# Patient Record
Sex: MALE | Race: BLACK OR AFRICAN AMERICAN | NOT HISPANIC OR LATINO | Employment: FULL TIME | URBAN - METROPOLITAN AREA
[De-identification: names, ages, dates, MRNs, and addresses within clinical notes are randomized per-mention and may not be internally consistent; named-entity substitution may affect disease eponyms.]

---

## 2017-08-29 ENCOUNTER — OFFICE VISIT (OUTPATIENT)
Dept: INTERNAL MEDICINE | Facility: CLINIC | Age: 27
End: 2017-08-29
Payer: COMMERCIAL

## 2017-08-29 ENCOUNTER — CLINICAL SUPPORT (OUTPATIENT)
Dept: CARDIOLOGY | Facility: CLINIC | Age: 27
End: 2017-08-29
Payer: COMMERCIAL

## 2017-08-29 ENCOUNTER — HOSPITAL ENCOUNTER (OUTPATIENT)
Dept: RADIOLOGY | Facility: HOSPITAL | Age: 27
Discharge: HOME | End: 2017-08-29
Attending: PHYSICIAN ASSISTANT
Payer: COMMERCIAL

## 2017-08-29 VITALS
WEIGHT: 306.88 LBS | OXYGEN SATURATION: 96 % | BODY MASS INDEX: 41.57 KG/M2 | HEART RATE: 83 BPM | TEMPERATURE: 97 F | DIASTOLIC BLOOD PRESSURE: 84 MMHG | SYSTOLIC BLOOD PRESSURE: 128 MMHG | HEIGHT: 72 IN

## 2017-08-29 DIAGNOSIS — R07.9 CHEST PAIN, UNSPECIFIED TYPE: Primary | ICD-10-CM

## 2017-08-29 DIAGNOSIS — R07.9 CHEST PAIN, UNSPECIFIED TYPE: ICD-10-CM

## 2017-08-29 DIAGNOSIS — R07.89 CHEST WALL PAIN: ICD-10-CM

## 2017-08-29 PROCEDURE — 3008F BODY MASS INDEX DOCD: CPT | Mod: S$GLB,,, | Performed by: PHYSICIAN ASSISTANT

## 2017-08-29 PROCEDURE — 71020 XR CHEST PA AND LATERAL: CPT | Mod: TC,PO

## 2017-08-29 PROCEDURE — 99213 OFFICE O/P EST LOW 20 MIN: CPT | Mod: S$GLB,,, | Performed by: PHYSICIAN ASSISTANT

## 2017-08-29 PROCEDURE — 99999 PR PBB SHADOW E&M-EST. PATIENT-LVL III: CPT | Mod: PBBFAC,,, | Performed by: PHYSICIAN ASSISTANT

## 2017-08-29 PROCEDURE — 71020 XR CHEST PA AND LATERAL: CPT | Mod: 26,,, | Performed by: RADIOLOGY

## 2017-08-29 PROCEDURE — 93000 ELECTROCARDIOGRAM COMPLETE: CPT | Mod: S$GLB,,, | Performed by: INTERNAL MEDICINE

## 2017-08-29 RX ORDER — NAPROXEN 500 MG/1
500 TABLET ORAL 2 TIMES DAILY WITH MEALS
Qty: 20 TABLET | Refills: 0 | Status: SHIPPED | OUTPATIENT
Start: 2017-08-29 | End: 2017-09-08

## 2017-08-29 NOTE — PATIENT INSTRUCTIONS
Noncardiac Chest Pain    Based on your visit today, the health care provider doesnt know what is causing your chest pain. In most cases, people who come to the emergency department with chest pain dont have a problem with their heart. Instead, the pain is caused by other conditions. These may be problems with the lungs, muscles, bones, digestive tract, nerves, or mental health.  Lung problems  · Inflammation around the lungs (pleurisy)  · Collapsed lung (pneumothorax)  · Fluid around the lungs (pleural effusion)  · Lung cancer. This is a rare cause of chest pain.  Muscle or bone problems  · Inflamed cartilage between the ribs (pleurisy)  · Fibromyalgia  · Rheumatoid arthritis  Digestive system problems  · Reflux  · Stomach ulcer  · Spasms of the esophagus  · Gall stones  · Gallbladder inflammation  Mental health conditions  · Panic or anxiety attacks  · Emotional distress  Your condition doesnt seem serious and your pain doesnt appear to be coming from your heart. But sometimes the signs of a serious problem take more time to appear. Watch for the warning signs listed below.  Home care  Follow these guidelines when caring for yourself at home:  · Rest today and avoid strenuous activity.  · Take any prescribed medicine as directed.  Follow-up care  Follow up with your health care provider, or as advised, if you dont start to feel better within 24 hours.  When to seek medical advice  Call your health care provider right away if any of these occur:  · A change in the type of pain. Call if it feels different, becomes more serious, lasts longer, or begins to spread into your shoulder, arm, neck, jaw, or back.  · Shortness of breath  · You feel more pain when you breathe  · Cough with dark-colored mucus or blood  · Weakness, dizziness, or fainting  · Fever of 100.4ºF (38ºC) or higher, or as directed by your health care provider  · Swelling, pain, or redness in one leg  Date Last Reviewed: 11/24/2014  © 8473-6424  [employer]. [address] [place] [address], [place] [address]. All rights reserved. This information is not intended as a substitute for professional medical care. Always follow your healthcare professional's instructions.        Chest Wall Pain: Costochondritis    The chest pain that you have had today is caused by costochondritis. This condition is caused by an inflammation of the cartilage joining your ribs to your breastbone. It is not caused by heart or lung problems. The inflammation may have been brought on by a blow to the chest, lifting heavy objects, intense exercise, or an illness that made you cough and sneeze. It often occurs during times of emotional stress. It can be painful, but it is not dangerous. It usually goes away in 1 to 2 weeks. But it may happen again. Rarely, a more serious condition may cause symptoms similar to costochondritis. Thats why its important to watch for the warning signs listed below.  Home care  Follow these guidelines when caring for yourself at home:  · If you feel that emotional stress is a cause of your condition, try to figure out the sources of that stress. It may not be obvious! Learn ways to deal with the stress in your life. This can include regular exercise, muscle relaxation, meditation, or simply taking time out for yourself. For more information about this, talk with your health care provider. Or go to your local library and look at books on stress reduction.  · You may use acetaminophen or ibuprofen to control pain, unless another pain medicine was prescribed. If you have liver disease or ever had a stomach ulcer, talk with your health care provider before using these medicines.  · You can also help ease pain by using a hot, wet compress or heating pad. Use this with or without a medicated skin cream that helps relieves pain.  · Do stretching exercise as advised by your provider.  · Take any prescribed medicines as directed.  Follow-up care  Follow up  with your health care provider, or as advised, if you do not start to get better in the next 2 days.  When to seek medical advice  Call your health care provider right away if any of these occur:  · A change in the type of pain. Call if it feels different, becomes more serious, lasts longer, or spreads into your shoulder, arm, neck, jaw, or back.  · Shortness of breath or pain gets worse when you breathe  · Weakness, dizziness, or fainting  · Cough with dark-colored sputum (phlegm) or blood  · Abdominal pain  · Dark red or black stools  · Fever of 100.4ºF (38ºC) or higher, or as directed by your health care provider  Date Last Reviewed: 11/24/2014 © 2000-2016 [employer]. [address] [place] [address]. All rights reserved. This information is not intended as a substitute for professional medical care. Always follow your healthcare professional's instructions.        Uncertain Causes of Chest Pain    Chest pain can happen for a number of reasons. Sometimes the cause can't be determined. If your condition does not seem serious, and your pain does not appear to be coming from your heart, your healthcare provider may recommend watching it closely. Sometimes the signs of a serious problem take more time to appear. Many problems not related to your heart can cause chest pain.These include:  · Musculoskeletal. Costochondritis, an inflammation of the tissues around the ribs that can occur from trauma or overuse injuries  · Respiratory. Pneumonia, pneumothorax, or pneumonitis (inflammation of the lining of the chest and lungs)  · Gastrointestinal. Esophageal reflux, heartburn, or gallbladder disease  · Anxiety and panic disorders  · Nerve compression and neuritis  · Miscellaneous problems such as aortic aneurysm or pulmonary embolism (a blood clot in the lungs)  Home care  After your visit, follow these recommendations:  · Rest today and avoid strenuous activity.  · Take any prescribed medicine as  directed.  · Be aware of any recurrent chest pain and notice any changes  Follow-up care  Follow up with your healthcare provider if you do not start to feel better within 24 hours, or as advised.  Call 911  Call 911 if any of these occur:  · A change in the type of pain: if it feels different, becomes more severe, lasts longer, or begins to spread into your shoulder, arm, neck, jaw or back  · Shortness of breath or increased pain with breathing  · Weakness, dizziness, or fainting  · Rapid heart beat  · Crushing sensation in your chest  When to seek medical advice  Call your healthcare provider right away if any of the following occur:  · Cough with dark colored sputum (phlegm) or blood  · Fever of 100.4ºF (38ºC) or higher, or as directed by your healthcare provider  · Swelling, pain or redness in one leg  · Shortness of breath  Date Last Reviewed: 12/30/2015  © 0756-7485 [employer]. [address] [place] [address], [place] [address]. All rights reserved. This information is not intended as a substitute for professional medical care. Always follow your healthcare professional's instructions.

## 2017-08-29 NOTE — PROGRESS NOTES
Subjective:      Patient ID: Rodrigo Edwards is a 26 y.o. male.    Chief Complaint: Chest Pain    Has had this problem in the past, went to ER. Pt states that his symptoms today are different and worse.       Chest Pain    This is a new problem. The current episode started today. The onset quality is sudden. The problem occurs constantly. The problem has been unchanged. The pain is present in the lateral region. The pain is at a severity of 7/10. The quality of the pain is described as sharp and pressure (sharp pain when he inhales). The pain radiates to the mid back. Associated symptoms include back pain, exertional chest pressure and nausea. Pertinent negatives include no abdominal pain, claudication, cough, diaphoresis, dizziness, fever, headaches, hemoptysis, irregular heartbeat, leg pain, lower extremity edema, malaise/fatigue, near-syncope, numbness, orthopnea, palpitations, PND, shortness of breath, sputum production, syncope, vomiting or weakness. Treatments tried: aspirin. The treatment provided no relief. Risk factors include lack of exercise, male gender, obesity and stress.   Pertinent negatives for past medical history include no aneurysm, no anxiety/panic attacks, no aortic aneurysm, no aortic dissection, no arrhythmia, no bicuspid aortic valve, no CAD, no cancer, no congenital heart disease, no connective tissue disease, no COPD, no CHF, no diabetes, no DVT, no hyperhomocysteinemia, no hyperlipidemia, no hypertension, no Kawasaki disease, no Marfan's syndrome, no MI, no mitral valve prolapse, no pacemaker, no PE, no PVD, no recent injury, no rheumatic fever, no seizures, no sickle cell disease, no sleep apnea, no spontaneous pneumothorax, no stimulant use, no strokes, no thyroid problem, no TIA, Holloway syndrome and no valve disorder.   His family medical history is significant for CAD, diabetes, heart disease, hyperlipidemia and hypertension.   Pertinent negatives for family medical history  include: no aortic dissection, no connective tissue disease, no Marfan's syndrome, no early MI, no PE, no PVD, no sickle cell disease, no stroke, no sudden death and no TIA.       Review of Systems   Constitutional: Negative for activity change, appetite change, chills, diaphoresis, fatigue, fever, malaise/fatigue and unexpected weight change.   HENT: Negative.  Negative for congestion, hearing loss, postnasal drip, rhinorrhea, sore throat, trouble swallowing and voice change.    Eyes: Negative.  Negative for visual disturbance.   Respiratory: Negative.  Negative for cough, hemoptysis, sputum production, choking, chest tightness and shortness of breath.    Cardiovascular: Positive for chest pain. Negative for palpitations, orthopnea, claudication, leg swelling, syncope, PND and near-syncope.   Gastrointestinal: Positive for nausea. Negative for abdominal distention, abdominal pain, blood in stool, constipation, diarrhea and vomiting.   Endocrine: Negative for cold intolerance, heat intolerance, polydipsia and polyuria.   Genitourinary: Negative.  Negative for difficulty urinating and frequency.   Musculoskeletal: Positive for back pain. Negative for arthralgias, gait problem, joint swelling and myalgias.   Skin: Negative for color change, pallor, rash and wound.   Neurological: Negative for dizziness, tremors, seizures, weakness, light-headedness, numbness and headaches.   Hematological: Negative for adenopathy.   Psychiatric/Behavioral: Positive for dysphoric mood. Negative for behavioral problems, confusion, decreased concentration, self-injury, sleep disturbance and suicidal ideas. The patient is not nervous/anxious.      Objective:   /84   Pulse 83   Temp 97.4 °F (36.3 °C) (Tympanic)   Ht 6' (1.829 m)   Wt (!) 139.2 kg (306 lb 14.1 oz)   SpO2 96%   BMI 41.62 kg/m²     Physical Exam   Constitutional: He is oriented to person, place, and time. He appears well-developed and well-nourished.   HENT:    Head: Normocephalic and atraumatic.   Right Ear: External ear normal.   Left Ear: External ear normal.   Nose: Nose normal.   Mouth/Throat: Oropharynx is clear and moist.   Eyes: Conjunctivae and EOM are normal. Pupils are equal, round, and reactive to light.   Neck: Normal range of motion. Neck supple.   Cardiovascular: Normal rate, regular rhythm and normal heart sounds.  Exam reveals no gallop and no friction rub.    No murmur heard.  Pulmonary/Chest: Effort normal and breath sounds normal. No respiratory distress. He has no wheezes. He has no rales. He exhibits tenderness.   Abdominal: Soft. He exhibits no distension. There is no tenderness.   Musculoskeletal: Normal range of motion.   Lymphadenopathy:     He has no cervical adenopathy.   Neurological: He is alert and oriented to person, place, and time.   Skin: Skin is warm and dry.   Psychiatric: He has a normal mood and affect. His behavior is normal. Judgment and thought content normal.   Vitals reviewed.      Assessment:     1. Chest pain, unspecified type    2. Chest wall pain      Plan:   Chest pain, unspecified type  -     X-Ray Chest PA And Lateral; Future; Expected date: 08/29/2017  -     EKG 12-lead; Future; Expected date: 08/29/2017    Chest wall pain  -     naproxen (NAPROSYN) 500 MG tablet; Take 1 tablet (500 mg total) by mouth 2 (two) times daily with meals.  Dispense: 20 tablet; Refill: 0    -likely chest wall pain. Diagnosis and treatment options discussed. A handout on costochondritis printed for him today. If symptoms worsen, go to ER.   -advised pt to get established with a PCP here at [facility].   \  Return if symptoms worsen or fail to improve.

## 2018-05-18 ENCOUNTER — OFFICE VISIT (OUTPATIENT)
Dept: INTERNAL MEDICINE | Facility: CLINIC | Age: 28
End: 2018-05-18
Payer: COMMERCIAL

## 2018-05-18 ENCOUNTER — NURSE TRIAGE (OUTPATIENT)
Dept: ADMINISTRATIVE | Facility: CLINIC | Age: 28
End: 2018-05-18

## 2018-05-18 VITALS
SYSTOLIC BLOOD PRESSURE: 110 MMHG | BODY MASS INDEX: 41.29 KG/M2 | WEIGHT: 304.88 LBS | HEIGHT: 72 IN | TEMPERATURE: 98 F | DIASTOLIC BLOOD PRESSURE: 74 MMHG

## 2018-05-18 DIAGNOSIS — F40.243 FEAR OF FLYING: Chronic | ICD-10-CM

## 2018-05-18 DIAGNOSIS — M76.62 TENDONITIS, ACHILLES, LEFT: Primary | ICD-10-CM

## 2018-05-18 DIAGNOSIS — M72.2 PLANTAR FASCIITIS OF LEFT FOOT: ICD-10-CM

## 2018-05-18 PROCEDURE — 99214 OFFICE O/P EST MOD 30 MIN: CPT | Mod: S$GLB,,, | Performed by: FAMILY MEDICINE

## 2018-05-18 PROCEDURE — 99999 PR PBB SHADOW E&M-EST. PATIENT-LVL III: CPT | Mod: PBBFAC,,, | Performed by: FAMILY MEDICINE

## 2018-05-18 RX ORDER — NAPROXEN 500 MG/1
500 TABLET ORAL 2 TIMES DAILY PRN
Qty: 30 TABLET | Refills: 1 | Status: SHIPPED | OUTPATIENT
Start: 2018-05-18 | End: 2018-07-24 | Stop reason: SDUPTHER

## 2018-05-18 RX ORDER — ALPRAZOLAM 0.5 MG/1
0.5 TABLET ORAL 2 TIMES DAILY PRN
Qty: 10 TABLET | Refills: 0 | Status: SHIPPED | OUTPATIENT
Start: 2018-05-18 | End: 2019-04-01

## 2018-05-18 NOTE — TELEPHONE ENCOUNTER
Reason for Disposition   [1] Thigh, calf, or ankle swelling AND [2] only 1 side    Protocols used: ST ANKLE SWELLING-A-AH    Pt c/o left foot and ankle pain and swelling. Denies injury. Care advice given, appt made.

## 2018-05-28 NOTE — PROGRESS NOTES
CHIEF COMPLAINT  Ankle Pain      HISTORY OF PRESENT ILLNESS    PROBLEM/CONDITION: NEW PROBLEM is ankle/foot pain. LOCATION is posterior and plantar aspect of left calcaneus. ONSET was over about a week ago. QUALITY described as aching discomfort. EXACERBATING FACTORS include prolonged walking and recent increased activity. He reports no recent relevant injury. He reports no recent quinolone use. Exam of left foot and ankle is remarkable for MILD tenderness on palpation over the insertion of the Achilles tendon and with palpation over the proximal aspect of the plantar fascia. The Achilles tendon is intact without deformity. Left foot and ankle are otherwise normal to inspection, palpation, strength testing, stress testing, and ROM. We discussed treatment options. I recommended physical therapy, but he said he did not have time for this. I gave him information on purchasing a proper AFO walking boot, and it was agreed to begin a trial of therapy with naproxen. He will let me know if this fails to resolve the problem.    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 He is requesting something to help with the anxiety. He has no history of chemical dependency. [place] Board of Pharmacy Controlled Prescription Drug Monitoring database was queried and showed no activity to suggest abuse, diversion, or other inappropriate use of prescription medications.    No other complaints or concerns reported.    Problem List Items Addressed This Visit        Psychiatric    Fear of flying (Chronic)    Relevant Medications    ALPRAZolam (XANAX) 0.5 MG tablet      Other Visit Diagnoses     Tendonitis, Achilles, left    -  Primary    Relevant Medications    naproxen (NAPROSYN) 500 MG tablet    Plantar fasciitis of left foot              PAST MEDICAL HISTORY, FAMILY HISTORY and SOCIAL HISTORY reviewed by me (GENNA Lazcano MD) and are updated consistent  "with the patient's report.    CURRENT MEDICATION LIST, and ALLERGY LIST reviewed by me (GENNA Lazcano MD) and are updated consistent with the patient's report as follows:  No outpatient prescriptions have been marked as taking for the 5/18/18 encounter (Office Visit) with GENNA Lazcano MD.      Allergies as of 05/18/2018 - Reviewed 05/18/2018   Allergen Reaction Noted    Effexor [venlafaxine] Other (See Comments) 09/10/2013    Lexapro [escitalopram] Other (See Comments) 09/10/2013    Doxycycline Other (See Comments) 07/31/2013    Lactose Diarrhea and Nausea And Vomiting        REVIEW OF SYSTEMS  CONSTITUTIONAL: No fever reported.  CARDIOVASCULAR: No chest pain reported.  PULMONARY: No trouble breathing reported.     PHYSICAL EXAM  Vitals:    05/18/18 1421   BP: 110/74   BP Location: Right arm   Patient Position: Sitting   BP Method: Large (Manual)   Temp: 98 °F (36.7 °C)   TempSrc: Tympanic   Weight: (!) 138.3 kg (304 lb 14.3 oz)   Height: 5' 11.5" (1.816 m)     CONSTITUTIONAL: Vital signs noted. No apparent distress. Does not appear acutely ill or septic. Appears adequately hydrated.  HEENT: External ENT grossly unremarkable. Hearing grossly intact. Oropharynx moist.  PULM: Lungs clear. Breathing unlabored.  HEART: Auscultation reveals regular rate and rhythm without murmur, gallop or rub.  DERM: Skin warm and moist with normal turgor.  NEURO: There are no gross focal motor deficits or gross deficits of cranial nerves III-XII.  PSYCHIATRIC: Alert and oriented x 3. Mood is grossly neutral. Affect appropriate. Judgment and insight not grossly compromised.  MUSCULOSKELETAL: Gait is mildly antalgic. Description of relevant exam of this system is documented above in HPI.     ASSESSMENT and PLAN  Tendonitis, Achilles, left  -     naproxen (NAPROSYN) 500 MG tablet; Take 1 tablet (500 mg total) by mouth 2 (two) times daily as needed (pain).  Dispense: 30 tablet; Refill: 1    Fear of flying  -     ALPRAZolam " "(XANAX) 0.5 MG tablet; Take 1 tablet (0.5 mg total) by mouth 2 (two) times daily as needed for Anxiety.  Dispense: 10 tablet; Refill: 0    Plantar fasciitis of left foot        PRESCRIPTION MEDICATION MANAGEMENT  Except as noted below, CURRENT MEDICATIONS are to remain unchanged from that listed above.    Medications Ordered This Encounter      ALPRAZolam (XANAX) 0.5 MG tablet          Sig: Take 1 tablet (0.5 mg total) by mouth 2 (two) times daily as needed for Anxiety.          Dispense:  10 tablet          Refill:  0      naproxen (NAPROSYN) 500 MG tablet          Sig: Take 1 tablet (500 mg total) by mouth 2 (two) times daily as needed (pain).          Dispense:  30 tablet          Refill:  1  There are no discontinued medications.    Follow-up in about 2 months (around 7/18/2018) for wellness and preventive services.    ABOUT THIS DOCUMENTATION:  · The order of the conditions listed in the HPI is one of convenience and does not necessarily reflect the chronology of the appointment, nor the relative importance of a condition. It is possible that additional description or status details about condition(s) may be found elsewhere in the EHR documentation for today's encounter.  · Documentation entered by me for this encounter was done in part using speech-recognition technology. Although I have made an effort to ensure accuracy, "sound like" errors may exist and should be interpreted in context.                        -GENNA Lazcano MD    There are no Patient Instructions on file for this visit.      "

## 2018-07-18 ENCOUNTER — TELEPHONE (OUTPATIENT)
Dept: INTERNAL MEDICINE | Facility: CLINIC | Age: 28
End: 2018-07-18

## 2018-07-18 NOTE — TELEPHONE ENCOUNTER
Called patient to reschedule as Dr. Lazcano is no longer in clinic on Wednesdays. Patient opted to cancel and reschedule at a later date. He denied any current issues with LLE.

## 2018-07-24 ENCOUNTER — OFFICE VISIT (OUTPATIENT)
Dept: INTERNAL MEDICINE | Facility: CLINIC | Age: 28
End: 2018-07-24
Payer: COMMERCIAL

## 2018-07-24 VITALS
SYSTOLIC BLOOD PRESSURE: 102 MMHG | BODY MASS INDEX: 41.8 KG/M2 | OXYGEN SATURATION: 98 % | HEART RATE: 74 BPM | WEIGHT: 308.63 LBS | TEMPERATURE: 98 F | DIASTOLIC BLOOD PRESSURE: 66 MMHG | HEIGHT: 72 IN

## 2018-07-24 DIAGNOSIS — M79.642 PAIN IN BOTH HANDS: ICD-10-CM

## 2018-07-24 DIAGNOSIS — M25.532 PAIN IN BOTH WRISTS: Primary | ICD-10-CM

## 2018-07-24 DIAGNOSIS — M76.62 TENDONITIS, ACHILLES, LEFT: ICD-10-CM

## 2018-07-24 DIAGNOSIS — M79.641 PAIN IN BOTH HANDS: ICD-10-CM

## 2018-07-24 DIAGNOSIS — Z11.3 ROUTINE SCREENING FOR STI (SEXUALLY TRANSMITTED INFECTION): ICD-10-CM

## 2018-07-24 DIAGNOSIS — M25.531 PAIN IN BOTH WRISTS: Primary | ICD-10-CM

## 2018-07-24 DIAGNOSIS — Z13.220 SCREENING FOR LIPID DISORDERS: ICD-10-CM

## 2018-07-24 DIAGNOSIS — E66.01 MORBID OBESITY DUE TO EXCESS CALORIES: Chronic | ICD-10-CM

## 2018-07-24 DIAGNOSIS — Z11.4 SCREENING FOR HIV WITHOUT PRESENCE OF RISK FACTORS: ICD-10-CM

## 2018-07-24 PROCEDURE — 99214 OFFICE O/P EST MOD 30 MIN: CPT | Mod: S$GLB,,, | Performed by: FAMILY MEDICINE

## 2018-07-24 PROCEDURE — 99999 PR PBB SHADOW E&M-EST. PATIENT-LVL III: CPT | Mod: PBBFAC,,, | Performed by: FAMILY MEDICINE

## 2018-07-24 RX ORDER — NAPROXEN 500 MG/1
500 TABLET ORAL 2 TIMES DAILY PRN
Qty: 30 TABLET | Refills: 1 | Status: SHIPPED | OUTPATIENT
Start: 2018-07-24 | End: 2019-04-01 | Stop reason: SDUPTHER

## 2018-07-24 NOTE — PROGRESS NOTES
CHIEF COMPLAINT  Hand Pain      HISTORY OF PRESENT ILLNESS    NEW PROBLEM is the hand and wrist pain. LOCATION is bilateral wrists and hands, worse on the right. ONSET was acutely during sleep last night. QUALITY described as a sharp and aching pain. SEVERITY described as SEVERE at worst, only MODERATE at present. ALLEVIATING FACTORS included over-the-counter ibuprofen, which provided MODERATE symptom relief. (He says that he lost his remaining doses of the naproxen 500 mg prescribed for his ankle injury. He says his ankle injury is resolved.) TIMING of symptoms described as improving. Symptoms occur in the CONTEXT of no recent relevant injury or strain/sprain. Physical exam is remarkable only for pain in the distribution of the flexor muscles with flexion of 4th and 5th fingers against resistance, right worse than left. I explained to him that I was uncertain of the cause of his hand and wrist pain. We discussed differential diagnosis. It was agreed to treat empirically with naproxen while awaiting results of tests.    Problem List Items Addressed This Visit        Endocrine    Morbid obesity due to excess calories (Chronic)    Current Assessment & Plan     --------------------------------------------------------------------------------  RELEVANT DATA REVIEWED:  Wt Readings from Last 6 Encounters:   07/24/18 (!) 140 kg (308 lb 10.3 oz)   05/18/18 (!) 138.3 kg (304 lb 14.3 oz)   08/29/17 (!) 139.2 kg (306 lb 14.1 oz)   07/28/14 108.9 kg (240 lb)   07/03/14 117.9 kg (260 lb)   02/19/14 121.7 kg (268 lb 6 oz)     BMI Readings from Last 6 Encounters:   07/24/18 42.45 kg/m²   05/18/18 41.93 kg/m²   08/29/17 41.62 kg/m²   07/28/14 32.55 kg/m²   07/03/14 36.26 kg/m²   02/19/14 36.40 kg/m²      Lab Results   Component Value Date    CHOL 172 07/31/2013    AST 23 07/31/2013    ALT 21 07/31/2013       This condition appears to be WORSE.    --------------------------------------------------------------------------------           Relevant Orders    CBC W/ AUTO DIFFERENTIAL    TSH    LIPID PANEL       Orthopedic    Pain in both wrists - Primary    Current Assessment & Plan     PLAN: Naproxen 500mg twice daily as needed         Relevant Medications    naproxen (NAPROSYN) 500 MG tablet    Other Relevant Orders    CBC W/ AUTO DIFFERENTIAL    Sedimentation rate    URIC ACID    Pain in both hands    Current Assessment & Plan     PLAN: Naproxen 500mg twice daily as needed         Relevant Medications    naproxen (NAPROSYN) 500 MG tablet    Other Relevant Orders    CBC W/ AUTO DIFFERENTIAL    Sedimentation rate    URIC ACID    RESOLVED: Tendonitis, Achilles, left    Current Assessment & Plan     This condition appears to be RESOLVED. He reports no residual symptoms or impairment.            Other Visit Diagnoses     Screening for HIV without presence of risk factors        Relevant Orders    HIV 1 / 2 ANTIBODY    Routine screening for STI (sexually transmitted infection)        Relevant Orders    RPR    C. trachomatis/N. gonorrhoeae by AMP DNA Urine (FIRST MORNING VOID)    Screening for lipid disorders        Relevant Orders    LIPID PANEL          PAST MEDICAL HISTORY, FAMILY HISTORY and SOCIAL HISTORY reviewed by me (GENNA Lazcano MD) and are updated consistent with the patient's report.    CURRENT MEDICATION LIST, and ALLERGY LIST reviewed by me (GENNA Lazcano MD) and are updated consistent with the patient's report as follows:    Outpatient Medications Prior to Visit   Medication Sig Dispense Refill    ALPRAZolam (XANAX) 0.5 MG tablet Take 1 tablet (0.5 mg total) by mouth 2 (two) times daily as needed for Anxiety. 10 tablet 0    naproxen (NAPROSYN) 500 MG tablet Take 1 tablet (500 mg total) by mouth 2 (two) times daily as needed (pain). 30 tablet 1     No facility-administered medications prior to visit.        Allergies as of 07/24/2018 - Reviewed 07/24/2018   Allergen Reaction Noted    Effexor [venlafaxine] Other (See Comments)  "09/10/2013    Lexapro [escitalopram] Other (See Comments) 09/10/2013    Doxycycline Other (See Comments) 07/31/2013    Lactose Diarrhea and Nausea And Vomiting        REVIEW OF SYSTEMS  CONSTITUTIONAL: No fever, sweats, or involuntary weight loss reported.  CARDIOVASCULAR: No chest pain reported.  PULMONARY: No trouble breathing reported.     PHYSICAL EXAM  Vitals:    07/24/18 1110   BP: 102/66   BP Location: Right arm   Patient Position: Sitting   BP Method: Large (Manual)   Pulse: 74   Temp: 97.5 °F (36.4 °C)   TempSrc: Tympanic   SpO2: 98%   Weight: (!) 140 kg (308 lb 10.3 oz)   Height: 5' 11.5" (1.816 m)   CONSTITUTIONAL: Vital signs noted. No apparent distress. Does not appear acutely ill or septic. Appears adequately hydrated.  PULM: Breathing unlabored.  HEART: Regular.  DERM: Skin normothermic with normal turgor.  NEURO: There are no gross focal motor deficits or gross deficits of cranial nerves III-XII.  PSYCHIATRIC: Alert and oriented x 3. Mood is grossly neutral. Affect appropriate. Judgment and insight not grossly compromised.   MUSCULOSKELETAL: Grossly normal stance and gait. Exam of hands and wrists is remarkable only for pain in the distribution of the flexor muscles with flexion of 4th and 5th fingers against resistance, right worse than left. Hands and wrists are otherwise normal to inspection, palpation, strength testing, sensation testing, and ROM testing.     ASSESSMENT and PLAN  Pain in both wrists  -     CBC W/ AUTO DIFFERENTIAL; Future; Expected date: 07/24/2018  -     Sedimentation rate; Future; Expected date: 07/24/2018  -     URIC ACID; Future; Expected date: 07/24/2018  -     naproxen (NAPROSYN) 500 MG tablet; Take 1 tablet (500 mg total) by mouth 2 (two) times daily as needed (pain).  Dispense: 30 tablet; Refill: 1    Pain in both hands  -     CBC W/ AUTO DIFFERENTIAL; Future; Expected date: 07/24/2018  -     Sedimentation rate; Future; Expected date: 07/24/2018  -     URIC ACID; " Future; Expected date: 07/24/2018  -     naproxen (NAPROSYN) 500 MG tablet; Take 1 tablet (500 mg total) by mouth 2 (two) times daily as needed (pain).  Dispense: 30 tablet; Refill: 1    Morbid obesity due to excess calories  -     CBC W/ AUTO DIFFERENTIAL; Future; Expected date: 07/24/2018  -     TSH; Future; Expected date: 07/24/2018  -     LIPID PANEL; Future; Expected date: 07/24/2018    Screening for HIV without presence of risk factors  -     HIV 1 / 2 ANTIBODY; Future; Expected date: 07/24/2018    Routine screening for STI (sexually transmitted infection)  -     RPR; Future; Expected date: 07/24/2018  -     C. trachomatis/N. gonorrhoeae by AMP DNA Urine (FIRST MORNING VOID); Future; Expected date: 07/24/2018    Screening for lipid disorders  -     LIPID PANEL; Future; Expected date: 07/24/2018    Tendonitis, Achilles, left        PRESCRIPTION MEDICATION MANAGEMENT  Except as noted below, CURRENT MEDICATIONS are to remain unchanged from that listed above.    Medications Ordered This Encounter      naproxen (NAPROSYN) 500 MG tablet          Sig: Take 1 tablet (500 mg total) by mouth 2 (two) times daily as needed (pain).          Dispense:  30 tablet          Refill:  1  Medications Discontinued During This Encounter   Medication Reason    naproxen (NAPROSYN) 500 MG tablet Reorder       Follow-up in about 1 week (around 7/31/2018) for re-evaluate problem(s) discussed today.    Patient Instructions   [email or web address]    You can get help with [facility] and [facility]:  by email at  [email or web address]  or by phone at [phone]    The [facility] Patient Support Team is available Monday through Friday from 9 a.m. until 5 p.m.  (After hours, you can leave a message requesting assistance.)    Additional Important Information  · [employer] is NOT to be used for urgent needs.  · Although we try to respond to messages within 2-3 business days, a response can take longer, depending on  "circumstances.  · For medical emergencies, dial 911.      ABOUT THIS DOCUMENTATION:  · The order of the conditions listed in the HPI is one of convenience and does not necessarily reflect the chronology of the appointment, nor the relative importance of a condition.  · Documentation entered by me for this encounter was done in part using speech-recognition technology. Although I have made an effort to ensure accuracy, "sound like" errors may exist and should be interpreted in context.                        -GENNA Lazcano MD       "

## 2018-07-24 NOTE — PATIENT INSTRUCTIONS
[email or web address]    You can get help with [employer] and [employer]:  by email at  [email or web address]  or by phone at [phone]    The [facility] Patient Support Team is available Monday through Friday from 9 a.m. until 5 p.m.  (After hours, you can leave a message requesting assistance.)    Additional Important Information  · [employer] is NOT to be used for urgent needs.  · Although we try to respond to messages within 2-3 business days, a response can take longer, depending on circumstances.  · For medical emergencies, dial 911.

## 2018-07-24 NOTE — ASSESSMENT & PLAN NOTE
--------------------------------------------------------------------------------  RELEVANT DATA REVIEWED:  Wt Readings from Last 6 Encounters:   07/24/18 (!) 140 kg (308 lb 10.3 oz)   05/18/18 (!) 138.3 kg (304 lb 14.3 oz)   08/29/17 (!) 139.2 kg (306 lb 14.1 oz)   07/28/14 108.9 kg (240 lb)   07/03/14 117.9 kg (260 lb)   02/19/14 121.7 kg (268 lb 6 oz)     BMI Readings from Last 6 Encounters:   07/24/18 42.45 kg/m²   05/18/18 41.93 kg/m²   08/29/17 41.62 kg/m²   07/28/14 32.55 kg/m²   07/03/14 36.26 kg/m²   02/19/14 36.40 kg/m²      Lab Results   Component Value Date    CHOL 172 07/31/2013    AST 23 07/31/2013    ALT 21 07/31/2013       This condition appears to be WORSE.    --------------------------------------------------------------------------------

## 2018-07-24 NOTE — LETTER
2018   1990    Dear Rodrigo,    Here is the excuse you asked for. Thanks for letting me care for you.    Kind regards,     GENNA Lazcano MD, [facility]  --------------------------------------------------------------------------------    PATIENT:   Rodrigo Edwards   YOB: 1990   DATE OF VISIT:  18    TO WHOM IT MAY CONCERN:     Rodrigo was treated in my office today.    He is estimated to be able to return to work without restrictions  in 1-2 days.    Please extend to Rodrigo all due courtesy and consideration and excuse his absence.    Sincerely,     GENNA Lazcano MD  [facility]  [address] [place] [address] [phone]

## 2018-07-25 ENCOUNTER — LAB VISIT (OUTPATIENT)
Dept: LAB | Facility: HOSPITAL | Age: 28
End: 2018-07-25
Attending: FAMILY MEDICINE
Payer: COMMERCIAL

## 2018-07-25 DIAGNOSIS — Z13.220 SCREENING FOR LIPID DISORDERS: ICD-10-CM

## 2018-07-25 DIAGNOSIS — M79.642 PAIN IN BOTH HANDS: ICD-10-CM

## 2018-07-25 DIAGNOSIS — M25.532 PAIN IN BOTH WRISTS: ICD-10-CM

## 2018-07-25 DIAGNOSIS — E66.01 MORBID OBESITY DUE TO EXCESS CALORIES: Chronic | ICD-10-CM

## 2018-07-25 DIAGNOSIS — M25.531 PAIN IN BOTH WRISTS: ICD-10-CM

## 2018-07-25 DIAGNOSIS — Z11.3 ROUTINE SCREENING FOR STI (SEXUALLY TRANSMITTED INFECTION): ICD-10-CM

## 2018-07-25 DIAGNOSIS — Z11.4 SCREENING FOR HIV WITHOUT PRESENCE OF RISK FACTORS: ICD-10-CM

## 2018-07-25 DIAGNOSIS — M79.641 PAIN IN BOTH HANDS: ICD-10-CM

## 2019-04-01 ENCOUNTER — OFFICE VISIT (OUTPATIENT)
Dept: INTERNAL MEDICINE | Facility: CLINIC | Age: 29
End: 2019-04-01
Payer: COMMERCIAL

## 2019-04-01 ENCOUNTER — HOSPITAL ENCOUNTER (OUTPATIENT)
Dept: RADIOLOGY | Facility: HOSPITAL | Age: 29
Discharge: HOME | End: 2019-04-01
Attending: FAMILY MEDICINE
Payer: COMMERCIAL

## 2019-04-01 VITALS
HEIGHT: 71 IN | SYSTOLIC BLOOD PRESSURE: 122 MMHG | TEMPERATURE: 98 F | HEART RATE: 88 BPM | OXYGEN SATURATION: 99 % | BODY MASS INDEX: 43.05 KG/M2 | DIASTOLIC BLOOD PRESSURE: 86 MMHG

## 2019-04-01 DIAGNOSIS — M79.642 PAIN IN BOTH HANDS: ICD-10-CM

## 2019-04-01 DIAGNOSIS — M25.561 ACUTE PAIN OF RIGHT KNEE: Primary | ICD-10-CM

## 2019-04-01 DIAGNOSIS — M25.532 PAIN IN BOTH WRISTS: ICD-10-CM

## 2019-04-01 DIAGNOSIS — E55.9 VITAMIN D DEFICIENCY DISEASE: ICD-10-CM

## 2019-04-01 DIAGNOSIS — M25.561 ACUTE PAIN OF RIGHT KNEE: ICD-10-CM

## 2019-04-01 DIAGNOSIS — M79.641 PAIN IN BOTH HANDS: ICD-10-CM

## 2019-04-01 DIAGNOSIS — E66.01 MORBID OBESITY DUE TO EXCESS CALORIES: Chronic | ICD-10-CM

## 2019-04-01 DIAGNOSIS — M54.50 INTERMITTENT LOW BACK PAIN: ICD-10-CM

## 2019-04-01 DIAGNOSIS — M25.531 PAIN IN BOTH WRISTS: ICD-10-CM

## 2019-04-01 DIAGNOSIS — M70.41 PREPATELLAR BURSITIS OF RIGHT KNEE: ICD-10-CM

## 2019-04-01 PROBLEM — M70.40 PREPATELLAR BURSITIS: Status: ACTIVE | Noted: 2019-04-01

## 2019-04-01 PROCEDURE — 99214 OFFICE O/P EST MOD 30 MIN: CPT | Mod: S$GLB,,, | Performed by: FAMILY MEDICINE

## 2019-04-01 PROCEDURE — 99214 PR OFFICE/OUTPT VISIT, EST, LEVL IV, 30-39 MIN: ICD-10-PCS | Mod: S$GLB,,, | Performed by: FAMILY MEDICINE

## 2019-04-01 PROCEDURE — 73560 XR KNEE ORTHO RIGHT: ICD-10-PCS | Mod: 26,59,LT, | Performed by: RADIOLOGY

## 2019-04-01 PROCEDURE — 73560 X-RAY EXAM OF KNEE 1 OR 2: CPT | Mod: 26,59,LT, | Performed by: RADIOLOGY

## 2019-04-01 PROCEDURE — 73562 XR KNEE ORTHO RIGHT: ICD-10-PCS | Mod: 26,RT,, | Performed by: RADIOLOGY

## 2019-04-01 PROCEDURE — 99999 PR PBB SHADOW E&M-EST. PATIENT-LVL IV: ICD-10-PCS | Mod: PBBFAC,,, | Performed by: FAMILY MEDICINE

## 2019-04-01 PROCEDURE — 99999 PR PBB SHADOW E&M-EST. PATIENT-LVL IV: CPT | Mod: PBBFAC,,, | Performed by: FAMILY MEDICINE

## 2019-04-01 PROCEDURE — 73562 X-RAY EXAM OF KNEE 3: CPT | Mod: TC,RT

## 2019-04-01 PROCEDURE — 73560 X-RAY EXAM OF KNEE 1 OR 2: CPT | Mod: TC,RT

## 2019-04-01 PROCEDURE — 73562 X-RAY EXAM OF KNEE 3: CPT | Mod: 26,RT,, | Performed by: RADIOLOGY

## 2019-04-01 RX ORDER — NAPROXEN 500 MG/1
500 TABLET ORAL 2 TIMES DAILY PRN
Qty: 30 TABLET | Refills: 1 | Status: SHIPPED | OUTPATIENT
Start: 2019-04-01

## 2019-04-02 ENCOUNTER — PATIENT MESSAGE (OUTPATIENT)
Dept: INTERNAL MEDICINE | Facility: CLINIC | Age: 29
End: 2019-04-02

## 2019-04-02 DIAGNOSIS — E55.9 VITAMIN D DEFICIENCY DISEASE: Primary | ICD-10-CM

## 2019-04-02 RX ORDER — ERGOCALCIFEROL 1.25 MG/1
50000 CAPSULE ORAL
Qty: 12 CAPSULE | Refills: 0 | Status: SHIPPED | OUTPATIENT
Start: 2019-04-02 | End: 2019-06-19

## 2019-04-02 NOTE — TELEPHONE ENCOUNTER
Lab Results   Component Value Date    [id number] 8 (L) 04/01/2019    [id number] 9 (L) 07/31/2013     Medications Ordered This Encounter   Medications    ergocalciferol (ERGOCALCIFEROL) 50,000 unit Cap     Sig: Take 1 capsule (50,000 Units total) by mouth every 7 days. for 12 doses     Dispense:  12 capsule     Refill:  0

## 2019-04-03 ENCOUNTER — OFFICE VISIT (OUTPATIENT)
Dept: ORTHOPEDICS | Facility: CLINIC | Age: 29
End: 2019-04-03
Payer: COMMERCIAL

## 2019-04-03 ENCOUNTER — APPOINTMENT (OUTPATIENT)
Dept: RADIOLOGY | Facility: HOSPITAL | Age: 29
End: 2019-04-03
Attending: FAMILY MEDICINE
Payer: COMMERCIAL

## 2019-04-03 VITALS
HEART RATE: 107 BPM | DIASTOLIC BLOOD PRESSURE: 74 MMHG | BODY MASS INDEX: 43.12 KG/M2 | HEIGHT: 71 IN | RESPIRATION RATE: 18 BRPM | SYSTOLIC BLOOD PRESSURE: 129 MMHG | WEIGHT: 308 LBS

## 2019-04-03 DIAGNOSIS — M70.51 PES ANSERINUS BURSITIS OF RIGHT KNEE: ICD-10-CM

## 2019-04-03 DIAGNOSIS — S80.01XA CONTUSION OF RIGHT KNEE, INITIAL ENCOUNTER: ICD-10-CM

## 2019-04-03 DIAGNOSIS — S83.91XA SPRAIN OF RIGHT KNEE, UNSPECIFIED LIGAMENT, INITIAL ENCOUNTER: Primary | ICD-10-CM

## 2019-04-03 DIAGNOSIS — R52 PAIN: ICD-10-CM

## 2019-04-03 DIAGNOSIS — R52 PAIN: Primary | ICD-10-CM

## 2019-04-03 PROCEDURE — 99213 PR OFFICE/OUTPT VISIT, EST, LEVL III, 20-29 MIN: ICD-10-PCS | Mod: S$GLB,,, | Performed by: FAMILY MEDICINE

## 2019-04-03 PROCEDURE — 73565 X-RAY EXAM OF KNEES: CPT | Mod: TC,PO

## 2019-04-03 PROCEDURE — 99999 PR PBB SHADOW E&M-EST. PATIENT-LVL III: CPT | Mod: PBBFAC,,, | Performed by: FAMILY MEDICINE

## 2019-04-03 PROCEDURE — 99213 OFFICE O/P EST LOW 20 MIN: CPT | Mod: S$GLB,,, | Performed by: FAMILY MEDICINE

## 2019-04-03 PROCEDURE — 73565 X-RAY EXAM OF KNEES: CPT | Mod: 26,,, | Performed by: RADIOLOGY

## 2019-04-03 PROCEDURE — 73565 XR KNEE AP STANDING BILATERAL: ICD-10-PCS | Mod: 26,,, | Performed by: RADIOLOGY

## 2019-04-03 PROCEDURE — 99999 PR PBB SHADOW E&M-EST. PATIENT-LVL III: ICD-10-PCS | Mod: PBBFAC,,, | Performed by: FAMILY MEDICINE

## 2019-04-03 NOTE — LETTER
April 3, 2019      GENNA Lazcano MD  [address]  [place] [address]           [facility] [address]  [facility] [address]  Phone: [phone]          Patient: Rodrigo Edwards   MR Number: [id number]   YOB: 1990   Date of Visit: 4/3/2019       Dear Dr. GENNA Lazcano:    Thank you for referring Rodrigo Edwards to me for evaluation. Attached you will find relevant portions of my assessment and plan of care.    If you have questions, please do not hesitate to call me. I look forward to following Rodrigo Edwards along with you.    Sincerely,    Christiano Bills MD    Enclosure  CC:  No Recipients    If you would like to receive this communication electronically, please contact [email or web address] or [phone] to request more information on [employer] access.    For providers and/or their staff who would like to refer a patient to [facility], please contact us through our one-stop-shop provider referral line, [facility], at [phone].    If you feel you have received this communication in error or would no longer like to receive these types of communications, please e-mail [email or web address]

## 2019-04-03 NOTE — PROGRESS NOTES
Subjective:     Patient ID: Rodrigo Edwards is a 28 y.o. male.    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  Over the last several days the knee has been extremely painful, not responsive to over-the-counter medication and he has noticed mild swelling and been limping at times.  Past Medical History:   Diagnosis Date    Major depression     Mixed migraine and muscle contraction headache     Obesity     Prostatitis      Past Surgical History:   Procedure Laterality Date    None       Family History   Problem Relation Age of Onset    Hypertension Unknown     Diabetes Unknown     Heart disease Unknown      Social History     Socioeconomic History    Marital status: Single     Spouse name: Not on file    Number of children: Not on file    Years of education: Not on file    Highest education level: Not on file   Occupational History     Employer: [employer]   Social Needs    Financial resource strain: Not on file    Food insecurity:     Worry: Not on file     Inability: Not on file    Transportation needs:     Medical: Not on file     Non-medical: Not on file   Tobacco Use    Smoking status: Never Smoker    Smokeless tobacco: Never Used   Substance and Sexual Activity    Alcohol use: Yes    Drug use: No    Sexual activity: Yes     Partners: Female   Lifestyle    Physical activity:     Days per week: Not on file     Minutes per session: Not on file    Stress: Not on file   Relationships    Social connections:     Talks on phone: Not on file     Gets together: Not on file     Attends Bahai service: Not on file     Active member of club or organization: Not on file     Attends meetings of clubs or organizations: Not on file     Relationship status: Not on file   Other  Topics Concern    Not on file   Social History Narrative    Not on file       Current Outpatient Medications:     ergocalciferol (ERGOCALCIFEROL) 50,000 unit Cap, Take 1 capsule (50,000 Units total) by mouth every 7 days. for 12 doses, Disp: 12 capsule, Rfl: 0    naproxen (NAPROSYN) 500 MG tablet, Take 1 tablet (500 mg total) by mouth 2 (two) times daily as needed (pain)., Disp: 30 tablet, Rfl: 1  Review of patient's allergies indicates:   Allergen Reactions    Effexor [venlafaxine] Other (See Comments)     Muscle tremors of the lower jaw    Lexapro [escitalopram] Other (See Comments)     suicidal ideation    Doxycycline Other (See Comments)     Throat Pain    Lactose Diarrhea and Nausea And Vomiting     Review of Systems   Constitutional: Negative for chills, fever and weight loss.   Respiratory: Negative for shortness of breath.    Cardiovascular: Negative for chest pain and palpitations.       Objective:   Body mass index is 42.96 kg/m².  Vitals:    04/03/19 1647   BP: 129/74   Pulse: 107   Resp: 18           Ortho/SPM Exam patient is a large adult male well-nourished well-developed, ambulatory alert and oriented and in no acute distress at this time    Right knee exam-no deformity he or discoloration noted it  Full range of motion with pain the last 5° of extension.  The knee is stable with negative Lachman's test.  The joint lines are not tender but there is tenderness at the process some will aspect of the tibia and also definite tenderness at the pes bursa of the right knee.  Neurovascular intact  IMAGING     Radiographs / Imaging : Relevant imaging results reviewed by me and interpreted by me, discussed with the patient and / or family -no fracture dislocation no advanced arthritic changes.    Procedure-right knee was injected at the point of maximal tenderness at the pes bursa with 23 gauge needle and 2 cc of Depo-Medrol 3 cc of lidocaine and 1 cc of bupivacaine was injected without difficulty and  with no complications.    The patient knows to apply ice every few hours over the next few days and to elevate his leg and he is to stay off work the next 2 days and rest the right lower extremity.  In addition when he returns to work he is instructed to use knee pads or place some type of softer material between his knee and the ground substance when he is kneeling.    Assessment:     Encounter Diagnoses   Name Primary?    Sprain of right knee, unspecified ligament, initial encounter Yes    Contusion of right knee, initial encounter     Pes anserinus bursitis of right knee         Plan:   Sprain of right knee, unspecified ligament, initial encounter    Contusion of right knee, initial encounter    Pes anserinus bursitis of right knee          Christiano Bills M.D.  [facility]

## 2019-04-16 ENCOUNTER — OFFICE VISIT (OUTPATIENT)
Dept: INTERNAL MEDICINE | Facility: CLINIC | Age: 29
End: 2019-04-16
Payer: COMMERCIAL

## 2019-04-16 DIAGNOSIS — M25.531 PAIN IN BOTH WRISTS: ICD-10-CM

## 2019-04-16 DIAGNOSIS — M79.641 PAIN IN BOTH HANDS: ICD-10-CM

## 2019-04-16 DIAGNOSIS — M79.642 PAIN IN BOTH HANDS: ICD-10-CM

## 2019-04-16 DIAGNOSIS — E55.9 VITAMIN D DEFICIENCY DISEASE: ICD-10-CM

## 2019-04-16 DIAGNOSIS — M25.532 PAIN IN BOTH WRISTS: ICD-10-CM

## 2019-04-16 PROBLEM — M70.50 PES ANSERINUS BURSITIS: Status: ACTIVE | Noted: 2019-04-01

## 2019-04-16 PROBLEM — M70.51 PES ANSERINUS BURSITIS OF RIGHT KNEE: Status: ACTIVE | Noted: 2019-04-01

## 2019-04-16 PROCEDURE — 99212 OFFICE O/P EST SF 10 MIN: CPT | Mod: PN | Performed by: FAMILY MEDICINE

## 2019-04-16 PROCEDURE — 99213 PR OFFICE/OUTPT VISIT, EST, LEVL III, 20-29 MIN: ICD-10-PCS | Mod: 95,,, | Performed by: FAMILY MEDICINE

## 2019-04-16 PROCEDURE — 99213 OFFICE O/P EST LOW 20 MIN: CPT | Mod: 95,,, | Performed by: FAMILY MEDICINE

## 2019-04-16 NOTE — ASSESSMENT & PLAN NOTE
Lab Results   Component Value Date    [id number] 8 (L) 04/01/2019    [id number] 9 (L) 07/31/2013    He says he is taking the newly prescribed vitamin-D2 as directed.

## 2019-04-16 NOTE — PROGRESS NOTES
"TELEMEDICINE VIRTUAL VISIT    CHIEF COMPLAINT  Follow-up      ENCOUNTER DIAGNOSES  1. Pain in both hands    2. Pain in both wrists    3. Vitamin D deficiency disease        HISTORY, ASSESSMENT, and PLAN    Problem List Items Addressed This Visit        Endocrine    Vitamin D deficiency disease    Current Assessment & Plan     Lab Results   Component Value Date    [id number] 8 (L) 04/01/2019    [id number] 9 (L) 07/31/2013    He says he is taking the newly prescribed vitamin-D2 as directed.            Orthopedic    Pain in both hands    Current Assessment & Plan     He reports this problem has resolved.         Pain in both wrists    Current Assessment & Plan     He reports this problem has resolved.               Outpatient Medications Prior to Visit   Medication Sig Dispense Refill    ergocalciferol (ERGOCALCIFEROL) 50,000 unit Cap Take 1 capsule (50,000 Units total) by mouth every 7 days. for 12 doses 12 capsule 0    naproxen (NAPROSYN) 500 MG tablet Take 1 tablet (500 mg total) by mouth 2 (two) times daily as needed (pain). 30 tablet 1     No facility-administered medications prior to visit.         There are no discontinued medications.     Follow up for any new complaints or concerns.    REVIEW OF SYSTEMS  CONSTITUTIONAL: No fever or chills reported.  MUSCULOSKELETAL: As above.  No other bone or joint complaints reported.    PHYSICAL EXAM  CONSTITUTIONAL: No apparent distress. Appears well. Does not appear acutely ill or septic. Appears adequately hydrated.  PULM: Breathing unlabored.  PSYCHIATRIC: Alert and conversant and grossly oriented. Mood is grossly neutral. Affect appropriate. Judgment and insight not grossly compromised.     Documentation entered by me for this encounter may have been done in part using speech-recognition technology. Although I have made an effort to ensure accuracy, "sound like" errors may exist and should be interpreted in context. WHIT Lazcano MD     Visit Details: This " visit was a telemedicine virtual visit with synchronous audio and video. Rodrigo reported that his location at the time of this visit was in the [facility]. Rodrigo had the choice to come into office today to receive these medical services. Rodrigo chose and consented to receive these medical services by telemedicine.

## 2019-04-16 NOTE — PROGRESS NOTES
CHIEF COMPLAINT  Knee Pain (Right)      ENCOUNTER DIAGNOSES  1. Acute pain of right knee    2. Pain in both wrists    3. Pain in both hands    4. Intermittent low back pain    5. Morbid obesity due to excess calories    6. Vitamin D deficiency disease    7. Prepatellar bursitis of right knee        NEW ORDERS SUMMARY  Orders Placed This Encounter    X-ray Knee Ortho Right    CBC auto differential    Sedimentation rate    Comprehensive metabolic panel    C-reactive protein    TSH    Lipid panel    Uric acid    Vitamin D    Ambulatory referral to Orthopedics    naproxen (NAPROSYN) 500 MG tablet       HISTORY, ASSESSMENT, and PLAN    Acute pain of right knee  Prepatellar bursitis of right knee       ASSESSMENT: This problem is NEW TO ME. This problem REQUIRES FURTHER WORK-UP.  Onset over the last couple of days.  Quality described as aching pain. Location reported as anterior right knee, just inferior to patella.  Severity described as mild-moderate at rest, moderately severe when exacerbated.  Exacerbating factors include kneeling and knee extension against resistance.  Symptoms occur in the context of his work requiring him to kneel on hard surfaces frequently.  We discussed differential diagnosis and treatment options.    Pain in both wrists   Pain in both hands       ASSESSMENT:  He describes arthralgias of bilateral wrists and hands, exacerbated by manual labor.  He reports no morning stiffness or swelling, and exam shows no sign of inflammatory arthropathy, but he is concerned about possible rheumatoid arthritis.    Intermittent low back pain       ASSESSMENT:  Chronic condition, exacerbated by activities involving bending and lifting.    Morbid obesity due to excess calories       ASSESSMENT:  Reasonably stable.  Therapeutic lifestyle changes encouraged.    Vitamin D deficiency disease       ASSESSMENT:  Previous labs showed vitamin D deficiency.       Problem List Items Addressed This Visit         Endocrine    Morbid obesity due to excess calories (Chronic)    Relevant Orders    Comprehensive metabolic panel (Completed)    TSH (Completed)    Lipid panel (Completed)    Vitamin D deficiency disease    Relevant Orders    Vitamin D (Completed)       Orthopedic    Intermittent low back pain    Pain in both hands    Relevant Orders    CBC auto differential (Completed)    Sedimentation rate (Completed)    C-reactive protein (Completed)    Uric acid (Completed)    Pain in both wrists    Relevant Orders    CBC auto differential (Completed)    Sedimentation rate (Completed)    C-reactive protein (Completed)    Uric acid (Completed)    Prepatellar bursitis    Relevant Medications    naproxen (NAPROSYN) 500 MG tablet      Other Visit Diagnoses     Acute pain of right knee    -  Primary    Relevant Medications    naproxen (NAPROSYN) 500 MG tablet    Other Relevant Orders    CBC auto differential (Completed)    Sedimentation rate (Completed)    C-reactive protein (Completed)    Uric acid (Completed)    X-ray Knee Ortho Right (Completed)    Ambulatory referral to Orthopedics          Outpatient Medications Prior to Visit   Medication Sig Dispense Refill    naproxen (NAPROSYN) 500 MG tablet Take 1 tablet (500 mg total) by mouth 2 (two) times daily as needed (pain). 30 tablet 1    ALPRAZolam (XANAX) 0.5 MG tablet Take 1 tablet (0.5 mg total) by mouth 2 (two) times daily as needed for Anxiety. 10 tablet 0     No facility-administered medications prior to visit.         Medications Ordered This Encounter   Medications    naproxen (NAPROSYN) 500 MG tablet     Sig: Take 1 tablet (500 mg total) by mouth 2 (two) times daily as needed (pain).     Dispense:  30 tablet     Refill:  1       Medications Discontinued During This Encounter   Medication Reason    ALPRAZolam (XANAX) 0.5 MG tablet Patient no longer taking    naproxen (NAPROSYN) 500 MG tablet Reorder        Follow up in about 2 weeks (around 4/15/2019) for review test  "results and discuss treatment plan, re-evaluate problem(s) discussed today.    REVIEW OF SYSTEMS  CONSTITUTIONAL: No fever, sweats, or involuntary weight loss reported.  PULM: No shortness of breath or breathing problems reported.  : No change in urinary habits reported.  GI: No change in bowel habits reported.  NEURO: No saddle anesthesia or loss of lower extremity strength or sensation reported.     PHYSICAL EXAM  Vitals:    04/01/19 1517   BP: 122/86   BP Location: Right arm   Patient Position: Sitting   Pulse: 88   Temp: 98.2 °F (36.8 °C)   TempSrc: Tympanic   SpO2: 99%   Height: 5' 11" (1.803 m)     CONSTITUTIONAL: Vital signs noted. No apparent distress. Does not appear acutely ill or septic. Appears adequately hydrated.  HEENT: External ENT grossly unremarkable. Hearing grossly intact. Oropharynx moist.  PULM: Lungs clear. Breathing unlabored.  HEART: Auscultation reveals regular rate and rhythm without murmur, gallop or rub.  DERM: Skin warm and moist with normal turgor.  NEURO: There are no gross focal motor deficits or gross deficits of cranial nerves III-XII.  PSYCHIATRIC: Alert and conversant. Mood grossly neutral. Judgment and insight not grossly compromised.   MUSCULOSKELETAL:  Stance and gait are antalgic.  Exam of wrists and hands reveals no joint inflammation or tenderness. Exam of right knee reveals tenderness of the infrapatellar region.  Exam of right knee otherwise unremarkable.    Documentation entered by me for this encounter may have been done in part using speech-recognition technology. Although I have made an effort to ensure accuracy, "sound like" errors may exist and should be interpreted in context. -GENNA Lazcano MD   "

## 2020-10-06 ENCOUNTER — PATIENT MESSAGE (OUTPATIENT)
Dept: ADMINISTRATIVE | Facility: HOSPITAL | Age: 30
End: 2020-10-06

## 2022-02-26 NOTE — PATIENT INSTRUCTIONS
2019        TO WHOM IT MAY CONCERN:     RE:  Rodrigo Edwards ,  1990     Rodrigo was treated in my office 19.    He is presently unable to return to work.    Rodrigo has been referred to a specialist for further evaluation and treatment. His appointment with the specialist will be later this week. His work-status will be re-evaluated then.    Please extend to Rodrigo all due courtesy and consideration and excuse his absence.    Sincerely,     GENNA Lazcano MD       What is bursitis?  A bursa is a fluid-filled sac that helps cushion the muscles, tendons, and bones around a joint. When a bursa becomes inflamed, its called bursitis. Common symptoms of bursitis include pain, tenderness, and swelling that limits movement of the joint.  What causes bursitis?  Bursitis is most often caused by overuse of a joint. The repeated movements irritate the bursa and may cause it to swell. When that happens, other surrounding tissues may become inflamed or have less space to move. Bursitis is most common in large joints such as the knee, shoulder, and hip.       Nonsurgical treatment involves both rest and exercise.    How is bursitis treated?  To help reduce pain and swelling, your healthcare provider may recommend one or more of the following:   · Rest gives the bursa time to heal. This means limiting activities that put stress on the joint.  · Anti-inflammatory medications help reduce painful swelling. In some cases, this can include injections of cortisone or other steroid medicines into the bursa.  · Splints and support bandages improve your comfort and allow the bursa to heal.  · Physical therapy may be used to increase flexibility and strengthen muscles that support the joint.  · Aspiration removes extra fluid from the bursa using a needle. This can help your healthcare provider find out what is causing your bursitis. For example, it might be an infection or overuse.   · Surgery  I suspect that your symptoms are due to your vagal stimulator.  Follow-up with your primary care practitioner for further evaluation and treatment possibly to include an event monitor or Holter monitor and follow-up with your neurologist at your convenience.  Return to the emergency department promptly as needed for new or worsening symptoms.   can be used to remove an inflamed or infected bursa. This is rarely needed.  Date Last Reviewed: 9/11/2015  © 3081-6712 [employer] [address] [place] [address], [place] [address]. All rights reserved. This information is not intended as a substitute for professional medical care. Always follow your healthcare professional's instructions.

## 2023-10-06 ENCOUNTER — OFFICE VISIT (OUTPATIENT)
Dept: INTERNAL MEDICINE | Facility: CLINIC | Age: 33
End: 2023-10-06
Payer: COMMERCIAL

## 2023-10-06 VITALS
DIASTOLIC BLOOD PRESSURE: 86 MMHG | HEIGHT: 71 IN | HEART RATE: 80 BPM | BODY MASS INDEX: 42.87 KG/M2 | TEMPERATURE: 98 F | WEIGHT: 306.25 LBS | OXYGEN SATURATION: 97 % | SYSTOLIC BLOOD PRESSURE: 116 MMHG

## 2023-10-06 DIAGNOSIS — R07.89 CHEST PRESSURE: ICD-10-CM

## 2023-10-06 DIAGNOSIS — R19.7 DIARRHEA, UNSPECIFIED TYPE: ICD-10-CM

## 2023-10-06 DIAGNOSIS — R11.0 NAUSEA: Primary | ICD-10-CM

## 2023-10-06 PROCEDURE — 3079F DIAST BP 80-89 MM HG: CPT | Mod: CPTII,S$GLB,, | Performed by: NURSE PRACTITIONER

## 2023-10-06 PROCEDURE — 3008F PR BODY MASS INDEX (BMI) DOCUMENTED: ICD-10-PCS | Mod: CPTII,S$GLB,, | Performed by: NURSE PRACTITIONER

## 2023-10-06 PROCEDURE — 1159F PR MEDICATION LIST DOCUMENTED IN MEDICAL RECORD: ICD-10-PCS | Mod: CPTII,S$GLB,, | Performed by: NURSE PRACTITIONER

## 2023-10-06 PROCEDURE — 99204 OFFICE O/P NEW MOD 45 MIN: CPT | Mod: S$GLB,,, | Performed by: NURSE PRACTITIONER

## 2023-10-06 PROCEDURE — 1159F MED LIST DOCD IN RCRD: CPT | Mod: CPTII,S$GLB,, | Performed by: NURSE PRACTITIONER

## 2023-10-06 PROCEDURE — 3008F BODY MASS INDEX DOCD: CPT | Mod: CPTII,S$GLB,, | Performed by: NURSE PRACTITIONER

## 2023-10-06 PROCEDURE — 99204 PR OFFICE/OUTPT VISIT, NEW, LEVL IV, 45-59 MIN: ICD-10-PCS | Mod: S$GLB,,, | Performed by: NURSE PRACTITIONER

## 2023-10-06 PROCEDURE — 99999 PR PBB SHADOW E&M-EST. PATIENT-LVL IV: CPT | Mod: PBBFAC,,, | Performed by: NURSE PRACTITIONER

## 2023-10-06 PROCEDURE — 3074F PR MOST RECENT SYSTOLIC BLOOD PRESSURE < 130 MM HG: ICD-10-PCS | Mod: CPTII,S$GLB,, | Performed by: NURSE PRACTITIONER

## 2023-10-06 PROCEDURE — 99999 PR PBB SHADOW E&M-EST. PATIENT-LVL IV: ICD-10-PCS | Mod: PBBFAC,,, | Performed by: NURSE PRACTITIONER

## 2023-10-06 PROCEDURE — 3074F SYST BP LT 130 MM HG: CPT | Mod: CPTII,S$GLB,, | Performed by: NURSE PRACTITIONER

## 2023-10-06 PROCEDURE — 3079F PR MOST RECENT DIASTOLIC BLOOD PRESSURE 80-89 MM HG: ICD-10-PCS | Mod: CPTII,S$GLB,, | Performed by: NURSE PRACTITIONER

## 2023-10-06 RX ORDER — ACETAMINOPHEN 500 MG
TABLET ORAL
COMMUNITY

## 2023-10-06 RX ORDER — ONDANSETRON 4 MG/1
4 TABLET, ORALLY DISINTEGRATING ORAL EVERY 12 HOURS PRN
Qty: 10 TABLET | Refills: 0 | Status: SHIPPED | OUTPATIENT
Start: 2023-10-06

## 2023-10-06 NOTE — PROGRESS NOTES
Subjective:       Patient ID: Rodrigo Edwards is a 32 y.o. male.    Chief Complaint: Chest Pain (Diarrhea, N/V since tuesday)    HPI    Diarrhea - 4-5 x day - since tues , improving   + nausea, still eating and drinking  Reports chest pressure, improved but still present  No sob minimal coughing      Past Medical History:   Diagnosis Date    Major depression     Mixed migraine and muscle contraction headache     Obesity     Prostatitis      Past Surgical History:   Procedure Laterality Date    None       Social History     Socioeconomic History    Marital status:    Occupational History     Employer: [employer]   Tobacco Use    Smoking status: Never    Smokeless tobacco: Never   Substance and Sexual Activity    Alcohol use: Yes    Drug use: No    Sexual activity: Yes     Partners: Female     Review of patient's allergies indicates:   Allergen Reactions    Effexor [venlafaxine] Other (See Comments)     Muscle tremors of the lower jaw    Lexapro [escitalopram] Other (See Comments)     suicidal ideation    Doxycycline Other (See Comments)     Throat Pain    Lactose Diarrhea and Nausea And Vomiting     Current Outpatient Medications   Medication Sig    cholecalciferol, vitamin D3, (VITAMIN D3) 125 mcg (5,000 unit) Tab     naproxen (NAPROSYN) 500 MG tablet Take 1 tablet (500 mg total) by mouth 2 (two) times daily as needed (pain).    ondansetron (ZOFRAN-ODT) 4 MG TbDL Take 1 tablet (4 mg total) by mouth every 12 (twelve) hours as needed (nausea).     No current facility-administered medications for this visit.           Review of Systems   Constitutional:  Negative for activity change, appetite change, chills, diaphoresis, fatigue, fever and unexpected weight change.   HENT:  Negative for congestion, ear pain, postnasal drip, rhinorrhea, sinus pressure, sinus pain, sneezing, sore throat, tinnitus, trouble swallowing and voice change.    Eyes:  Negative for photophobia, pain and visual disturbance.    Respiratory:  Positive for chest tightness. Negative for cough, shortness of breath and wheezing.    Cardiovascular:  Negative for chest pain, palpitations and leg swelling.   Gastrointestinal:  Positive for abdominal pain, diarrhea and nausea. Negative for abdominal distention, constipation and vomiting.   Genitourinary:  Negative for decreased urine volume, difficulty urinating, dysuria, flank pain, frequency, hematuria and urgency.   Musculoskeletal:  Negative for arthralgias, back pain, joint swelling, neck pain and neck stiffness.   Allergic/Immunologic: Negative for immunocompromised state.   Neurological:  Negative for dizziness, tremors, seizures, syncope, facial asymmetry, speech difficulty, weakness, light-headedness, numbness and headaches.   Hematological:  Negative for adenopathy. Does not bruise/bleed easily.   Psychiatric/Behavioral:  Negative for confusion and sleep disturbance.        Objective:      Physical Exam  Vitals reviewed.   Constitutional:       Appearance: He is obese.   Cardiovascular:      Rate and Rhythm: Normal rate and regular rhythm.      Heart sounds: Normal heart sounds.   Pulmonary:      Effort: Pulmonary effort is normal.      Breath sounds: Normal breath sounds.   Abdominal:      Tenderness: There is abdominal tenderness in the left lower quadrant.   Musculoskeletal:         General: Normal range of motion.   Skin:     General: Skin is warm and dry.   Neurological:      Mental Status: He is alert and oriented to person, place, and time.         Assessment:     Vitals:    10/06/23 1336   BP: 116/86   Pulse: 80   Temp: 98.2 °F (36.8 °C)         1. Nausea    2. Diarrhea, unspecified type    3. Chest pressure        Plan:   Nausea    Diarrhea, unspecified type    Chest pressure  -     EKG 12-lead; Future; Expected date: 10/06/2023  -     X-Ray Chest PA And Lateral; Future; Expected date: 10/06/2023    Other orders  -     ondansetron (ZOFRAN-ODT) 4 MG TbDL; Take 1 tablet (4 mg  total) by mouth every 12 (twelve) hours as needed (nausea).  Dispense: 10 tablet; Refill: 0        Declines labs  Zofran prn  Wants ekg, cxr

## 2023-11-29 ENCOUNTER — PATIENT MESSAGE (OUTPATIENT)
Dept: URGENT CARE | Facility: CLINIC | Age: 33
End: 2023-11-29
Payer: COMMERCIAL

## 2023-11-29 ENCOUNTER — NURSE TRIAGE (OUTPATIENT)
Dept: ADMINISTRATIVE | Facility: CLINIC | Age: 33
End: 2023-11-29
Payer: COMMERCIAL

## 2023-11-29 NOTE — TELEPHONE ENCOUNTER
Pt calling about eyes red and swollen and discharge. Pt said that pipes at work yesterday had brown water and was splashed in the eyes and now woke up with pain and redness, Pt triaged and care advice to be seen in the ED or PCP approval appt could be seen this am and he wants to go to  near his house. Appt cancelled.                Reason for Disposition   SEVERE pain (e.g., excruciating)    Protocols used: Eye - Pus or [id number]

## 2023-11-29 NOTE — TELEPHONE ENCOUNTER
Spoke with pt. States he will keep be seen at urgent care. Verified he canceled appt with Dr. Donte MD on today.//ddw

## 2025-06-24 ENCOUNTER — PATIENT OUTREACH (OUTPATIENT)
Dept: ADMINISTRATIVE | Facility: HOSPITAL | Age: 35
End: 2025-06-24
Payer: COMMERCIAL